# Patient Record
Sex: FEMALE | Race: WHITE | NOT HISPANIC OR LATINO | URBAN - METROPOLITAN AREA
[De-identification: names, ages, dates, MRNs, and addresses within clinical notes are randomized per-mention and may not be internally consistent; named-entity substitution may affect disease eponyms.]

---

## 2024-08-09 ENCOUNTER — APPOINTMENT (OUTPATIENT)
Dept: OBSTETRICS AND GYNECOLOGY | Facility: CLINIC | Age: 34
End: 2024-08-09
Payer: COMMERCIAL

## 2024-08-09 ENCOUNTER — TELEPHONE (OUTPATIENT)
Dept: OBSTETRICS AND GYNECOLOGY | Facility: CLINIC | Age: 34
End: 2024-08-09

## 2024-08-09 VITALS
HEIGHT: 62 IN | WEIGHT: 112 LBS | DIASTOLIC BLOOD PRESSURE: 70 MMHG | BODY MASS INDEX: 20.61 KG/M2 | SYSTOLIC BLOOD PRESSURE: 109 MMHG

## 2024-08-09 DIAGNOSIS — Z30.432 ENCOUNTER FOR IUD REMOVAL: ICD-10-CM

## 2024-08-09 DIAGNOSIS — N63.22 MASS OF UPPER INNER QUADRANT OF LEFT BREAST: Primary | ICD-10-CM

## 2024-08-09 RX ORDER — COPPER 313.4 MG/1
INTRAUTERINE DEVICE INTRAUTERINE
COMMUNITY

## 2024-08-09 NOTE — PROGRESS NOTES
"GYNECOLOGY PROGRESS NOTE          CC:     Chief Complaint   Patient presents with    Breast Problem        HPI:  Qian Pratt is here with left breast mass.  She states that she has a known fibroadenoma that she had biopsied more than five years ago.  Was offered excision if wanted however per patient encouraged to not excise.  She feels like the mass on the left side is the same size but \"drops down into breast\" more than it used before breastfeeding.  No dipple discharge.  Does have fibrocystic breasts and gets tenderness premenstrual.   Also desires IUD removal.  Has 3 aunts with breast cancer    Anahi Samson MD  History reviewed. No pertinent past medical history.  Past Surgical History:   Procedure Laterality Date    OTHER SURGICAL HISTORY  09/17/2019    No history of surgery     Social History     Tobacco Use    Smoking status: Never    Smokeless tobacco: Never     No family history on file.   [unfilled]    ROS:  GYN - see HPI        PHYSICAL EXAM:  /70 (BP Location: Right arm, Patient Position: Sitting)   Ht 1.575 m (5' 2\")   Wt 50.8 kg (112 lb)   BMI 20.49 kg/m²   GEN:  A&O, NAD  HEENT:  head HC/AT, no visible goiter  PSYCH:  normal affect, non-anxious  Right breast very fibrocystic but tissue is mobile, no masses or nipple change or axillary LAD  Left breast with firm mobile mass 1.3 cm at 3 o'clock also dense fibrocystic tissue but mobile      IMPRESSION/PLAN:    Problem List Items Addressed This Visit       Mass of upper inner quadrant of left breast - Primary    Current Assessment & Plan     D/w pt has been awhile since imaged and if noticing change should have imaging and follow up with surgeon  Pt went to [facility] and wants to follow up there  Ordered bilateral diagnostics and left breast  ultrasound  Stressed the importance of follow up with breast surgeon to make sure not cancer and offered to help her get appt at  but declines for now will contact me if unable to get done at [facility]   "       Relevant Orders    BI mammo bilateral diagnostic tomosynthesis    BI US breast complete left    Referral to Breast Surgery       Patient ID: Qian Pratt is a 34 y.o. female.    IUD Removal    Performed by: Anahi FERNÁNDEZ MD  Authorized by: Anahi FERNÁNDEZ MD    Procedure: IUD removal    Consent obtained by patient, parent, or legal power of  - including discussion of procedure risks and benefits, patient questions answered, and patient education provided: yes    Reason for removal: patient request    Strings visualized: yes    Tenaculum applied to cervix: no    IUD grasped by forceps: yes    Performed with ultrasound guidance: no    IUD removed: yes    Date/Time of Removal:  8/9/2024 1:35 PM  Removed without complications: yes    IUD intact: yes    Removal comments: paraguard  Cervix manually dilated: no

## 2024-08-09 NOTE — ASSESSMENT & PLAN NOTE
D/w pt has been awhile since imaged and if noticing change should have imaging and follow up with surgeon  Pt went to [facility] and wants to follow up there  Ordered bilateral diagnostics and left breast  ultrasound  Stressed the importance of follow up with breast surgeon to make sure not cancer and offered to help her get appt at  but declines for now will contact me if unable to get done at [facility]

## 2024-08-09 NOTE — TELEPHONE ENCOUNTER
Pt has question re breast imaging orders. Are they for the left breast side only or both? If for both, pt would like to know why. Please call her when available.

## 2024-08-14 ENCOUNTER — TELEPHONE (OUTPATIENT)
Dept: OBSTETRICS AND GYNECOLOGY | Facility: CLINIC | Age: 34
End: 2024-08-14

## 2024-08-14 NOTE — TELEPHONE ENCOUNTER
Pt wanted to know why her mammogram is for both breasts. She was expecting it just for her left. Pt cannot be reached by phone and asked that she receive a [employer] message with explanation.

## 2024-08-20 ENCOUNTER — APPOINTMENT (OUTPATIENT)
Dept: RADIOLOGY | Facility: HOSPITAL | Age: 34
End: 2024-08-20
Payer: COMMERCIAL

## 2024-08-21 ENCOUNTER — HOSPITAL ENCOUNTER (OUTPATIENT)
Dept: RADIOLOGY | Facility: EXTERNAL LOCATION | Age: 34
Discharge: HOME | End: 2024-08-21

## 2024-08-21 ENCOUNTER — APPOINTMENT (OUTPATIENT)
Dept: RADIOLOGY | Facility: HOSPITAL | Age: 34
End: 2024-08-21
Payer: COMMERCIAL

## 2024-08-22 NOTE — PROGRESS NOTES
Qian Pratt female   1990 34 y.o.   [id number]      Chief Complaint  New patient, left breast skin changes     History Of Present Illness  Qian Pratt is a very pleasant 34 y.o.  female presenting with left breast skin changes. States she noticed dimpling of the skin over her left breast fibroadenoma. Denies skin discoloration, new palpable masses or nipple changes. 10/2016 left breast core biopsy, fibroadenoma. She denies breast surgery. She has family history breast cancer in 2 paternal aunts and maternal grandmother.    BREAST IMAGIN2024 bilateral diagnostic mammogram and left breast ultrasound, BI-RADS Category 2. LEFT breast 10:00 6 cm from the nipple, biopsy proven fibroadenoma The area of  dimpling is noted to be associated with previous biopsy needle entry site. No suspicious finding is identified on the ultrasound examination    REPRODUCTIVE HISTORY: menarche age 12, , first birth age 25,  8 months, OCP's 5-10 years, premenopausal, LMP 2024,  no HRT, breast tissue                                   FAMILY CANCER HISTORY:   Maternal grandmother: Breast cancer, age 40s  Paternal aunt: Breast cancer, age 50s  Paternal aunt: Breast cancer, age 50s    Surgical History  She has a past surgical history that includes Other surgical history (2019).     Social History  She reports that she has never smoked. She has never used smokeless tobacco. No history on file for alcohol use and drug use.    Family History  Family History   Problem Relation Name Age of Onset    Breast cancer Maternal Grandmother      Breast cancer Father's Sister          Allergies  Penicillins    Medications  No current outpatient medications        REVIEW OF SYSTEMS    Constitutional:  Negative for appetite change, fatigue, fever and unexpected weight change.   HENT:  Negative for ear pain, hearing loss, nosebleeds, sore throat and trouble swallowing.    Eyes:  Negative for discharge, itching and  visual disturbance.   Respiratory:  Negative for cough, chest tightness and shortness of breath.    Cardiovascular:  Negative for chest pain, palpitations and leg swelling.   Breast: as indicated in HPI  Gastrointestinal:  Negative for abdominal pain, constipation, diarrhea and nausea.   Endocrine: Negative for cold intolerance and heat intolerance.   Genitourinary:  Negative for dysuria, frequency, hematuria, pelvic pain and vaginal bleeding.   Musculoskeletal:  Negative for arthralgias, back pain, gait problem, joint swelling and myalgias.   Skin:  Negative for color change and rash.   Allergic/Immunologic: Negative for environmental allergies and food allergies.   Neurological:  Negative for dizziness, tremors, speech difficulty, weakness, numbness and headaches.   Hematological:  Does not bruise/bleed easily.   Psychiatric/Behavioral:  Negative for agitation, dysphoric mood and sleep disturbance. The patient is not nervous/anxious.         Past Medical History  She has no past medical history on file.     Physical Exam  Patient is alert and oriented x3 and in a relaxed and appropriate mood. Her gait is steady and hand grasps are equal. Sclera is clear. The breasts are nearly symmetrical. Left breast, 10:00 6 cm from the nipple, 2 cm soft mobile mass with biopsy site dimpling of the skin. The right breast tissue is soft without palpable abnormalities, discrete nodules or masses. The right breast skin and nipples appear normal. No visible dimpling or pulling of the skin. There is no cervical, supraclavicular or axillary lymphadenopathy. Heart rate and rhythm normal, S1 and S2 appreciated. The lungs are clear to auscultation bilaterally. Abdomen is soft and non-tender.       Physical Exam  Chest:              Last Recorded Vitals  Vitals:    08/23/24 1415   BP: 127/87   Pulse: 110   Resp: 18   Temp: 37 °C (98.6 °F)   SpO2: 97%       Relevant Results   Time was spent viewing digital images of the radiology testing  with the patient. I explained the results in depth, along with suggested explanation for follow up recommendations based on the testing results. BI-RADS Category 2    Imaging     Interpreted By:  Deborah Valladares,   STUDY:  BI MAMMO BILATERAL DIAGNOSTIC TOMOSYNTHESIS; BI US BREAST LIMITED  LEFT;  8/23/2024 1:53 pm; 8/23/2024 2:12 pm      ACCESSION NUMBER(S):  [id number]; [id number]      ORDERING CLINICIAN:  APARNA OLSON      INDICATION:  Left breast biopsy proven palpable fibroadenoma; skin dimpling/shaped  change upper inner quadrant.      COMPARISON:  Ultrasound 10/12/2016      FINDINGS:  MAMMOGRAPHY: 2D and tomosynthesis images were reviewed at 1 mm slice  thickness.      Density:  The breast tissue is extremely dense, which may limit the  sensitivity of mammography.      A radiopaque marker was placed at the dimpling/shaped change in the  inferior medial left breast at anterior depth, in the vicinity of a  biopsy tissue marker. No suspicious masses or calcifications are  identified in either breast within the limitations of extremely dense  breast tissue.      ULTRASOUND:  Targeted ultrasound examination with elastography of the left breast  area of concern at 10:00 position 6 cm from the nipple demonstrates a  biopsy proven fibroadenoma with associated tissue marker. The area of  dimpling is noted to be associated with previous biopsy needle entry  site. No suspicious finding is identified on the ultrasound  examination.      IMPRESSION:  No mammographic or targeted sonographic evidence of malignancy.  Recommendation is for the patient to return for routine annual  mammogram or sooner if clinically indicated.      BI-RADS CATEGORY:      BI-RADS Category:  2 Benign.  Recommendation:  Annual Screening.  Recommended Date:  1 Year.  Laterality:  Bilateral.    Assessment/Plan       PLAN:  Stable clinical exam and imaging (left breast fibroadenoma), 1 benign left breast biopsy, family history breast  cancer.   Discussed 6 month follow up for high risk evaluation.    Patient Discussion/Summary  Your clinical examination and imaging are normal. Please return in 6 months for high risk evaluation and office visit or sooner if you have any problems or concerns.     You can see your health information, review clinical summaries from office visits & test results online when you follow your health with MY  Chart, a personal health record. To sign up go to [email or web address]. If you need assistance with signing up or trouble getting into your account call [facility] Patient Line 24/7 at [phone].    My office phone number is [phone] if you need to get in touch with me or have additional questions or concerns. Thank you for choosing [facility] and trusting me as your healthcare provider. I look forward to seeing you again at your next office visit. I am honored to be a provider on your health care team and I remain dedicated to helping you achieve your health goals.      Rowan Barrientos, APRN-CNP

## 2024-08-23 ENCOUNTER — OFFICE VISIT (OUTPATIENT)
Dept: SURGICAL ONCOLOGY | Facility: HOSPITAL | Age: 34
End: 2024-08-23
Payer: COMMERCIAL

## 2024-08-23 ENCOUNTER — HOSPITAL ENCOUNTER (OUTPATIENT)
Dept: RADIOLOGY | Facility: HOSPITAL | Age: 34
Discharge: HOME | End: 2024-08-23
Payer: COMMERCIAL

## 2024-08-23 VITALS — HEIGHT: 62 IN | BODY MASS INDEX: 20.61 KG/M2 | WEIGHT: 112 LBS

## 2024-08-23 VITALS
WEIGHT: 110.23 LBS | BODY MASS INDEX: 20.16 KG/M2 | DIASTOLIC BLOOD PRESSURE: 87 MMHG | TEMPERATURE: 98.6 F | HEART RATE: 110 BPM | RESPIRATION RATE: 18 BRPM | OXYGEN SATURATION: 97 % | SYSTOLIC BLOOD PRESSURE: 127 MMHG

## 2024-08-23 DIAGNOSIS — N63.22 MASS OF UPPER INNER QUADRANT OF LEFT BREAST: ICD-10-CM

## 2024-08-23 PROCEDURE — 99214 OFFICE O/P EST MOD 30 MIN: CPT

## 2024-08-23 PROCEDURE — 99204 OFFICE O/P NEW MOD 45 MIN: CPT

## 2024-08-23 PROCEDURE — 76642 ULTRASOUND BREAST LIMITED: CPT | Mod: LT

## 2024-08-23 PROCEDURE — 77062 BREAST TOMOSYNTHESIS BI: CPT

## 2024-08-23 ASSESSMENT — PATIENT HEALTH QUESTIONNAIRE - PHQ9
2. FEELING DOWN, DEPRESSED OR HOPELESS: NOT AT ALL
1. LITTLE INTEREST OR PLEASURE IN DOING THINGS: NOT AT ALL
SUM OF ALL RESPONSES TO PHQ9 QUESTIONS 1 & 2: 0

## 2024-08-23 ASSESSMENT — PAIN SCALES - GENERAL: PAINLEVEL: 0-NO PAIN

## 2024-08-26 NOTE — PATIENT INSTRUCTIONS
Your clinical examination and imaging are normal. Please return in 6 months for high risk evaluation and office visit or sooner if you have any problems or concerns.     You can see your health information, review clinical summaries from office visits & test results online when you follow your health with MY  Chart, a personal health record. To sign up go to [email or web address]. If you need assistance with signing up or trouble getting into your account call [facility] Patient Line 24/7 at [phone].    My office phone number is [phone] if you need to get in touch with me or have additional questions or concerns. Thank you for choosing [facility] and trusting me as your healthcare provider. I look forward to seeing you again at your next office visit. I am honored to be a provider on your health care team and I remain dedicated to helping you achieve your health goals.